# Patient Record
Sex: FEMALE | Race: WHITE | Employment: FULL TIME | URBAN - METROPOLITAN AREA
[De-identification: names, ages, dates, MRNs, and addresses within clinical notes are randomized per-mention and may not be internally consistent; named-entity substitution may affect disease eponyms.]

---

## 2021-06-10 ENCOUNTER — OFFICE VISIT (OUTPATIENT)
Dept: PEDIATRIC CARDIOLOGY | Age: 36
End: 2021-06-10

## 2021-06-10 VITALS
BODY MASS INDEX: 32.3 KG/M2 | WEIGHT: 205.8 LBS | HEIGHT: 67 IN | DIASTOLIC BLOOD PRESSURE: 74 MMHG | OXYGEN SATURATION: 96 % | SYSTOLIC BLOOD PRESSURE: 126 MMHG | HEART RATE: 65 BPM

## 2021-06-10 DIAGNOSIS — Z95.0 CARDIAC PACEMAKER: ICD-10-CM

## 2021-06-10 DIAGNOSIS — I49.5 SINUS NODE DYSFUNCTION (HCC): Primary | ICD-10-CM

## 2021-06-10 PROCEDURE — 99211 OFF/OP EST MAY X REQ PHY/QHP: CPT | Performed by: PEDIATRICS

## 2021-06-10 PROCEDURE — 99214 OFFICE O/P EST MOD 30 MIN: CPT | Performed by: PEDIATRICS

## 2021-06-10 RX ORDER — TRIAMCINOLONE ACETONIDE 1 MG/G
CREAM TOPICAL
COMMUNITY
Start: 2020-09-24

## 2021-06-10 NOTE — PROGRESS NOTES
the AAI/DDD mode by pacemaker interrogation.         b.  1:1 conduction, mildly abnormal under anesthesia during generator change, 03/04/2011: Wenckebach cycle length 430 ms. Discussion:  Alvaro Marx is using the pacemaker less now than she did a few years ago when I last checked it. Though she is on a 2 year followup schedule with Dr. Laura Britton I believe her device warrants annual checks in person and quarterly remote checks. It could be argued that she has no further pacing indication but we do not know this for sure, and I think Alvaro Marx would be reluctant to be without one. In the coming 3 years we may be able to inform this decision. Nonetheless, transition to an [facility] service in [place] seems most appropriate for her and I will propose this to Dr. Laura Britton. Patient Active Problem List    Diagnosis Date Noted    S/P VSD closure 01/08/2015    Arrhythmia 01/08/2015    Cardiac pacemaker 05/17/2012    Sinus node dysfunction ([address] 05/17/2012    VSD (ventricular septal defect) 05/17/2012     Recommendations:  1. Routine primary care  2. Infective endocarditis prophylaxis is not required  3. No activity restriction  4. Patient instructions provided in after-visit summary:  Patient Instructions   Send remote pacemaker checks every 3 months or as needed. 5. Follow-up: Return in about 1 year (around 6/10/2022). 6. We will re-establish standard remote device checks; Patient cell phone number provided to remote coordinator at [address].